# Patient Record
Sex: MALE | Race: WHITE | Employment: FULL TIME | URBAN - METROPOLITAN AREA
[De-identification: names, ages, dates, MRNs, and addresses within clinical notes are randomized per-mention and may not be internally consistent; named-entity substitution may affect disease eponyms.]

---

## 2021-01-21 PROBLEM — R06.83 SNORING: Status: ACTIVE | Noted: 2021-01-21

## 2021-01-21 PROBLEM — R09.81 NASAL CONGESTION: Status: ACTIVE | Noted: 2021-01-21

## 2021-02-13 ENCOUNTER — APPOINTMENT (OUTPATIENT)
Dept: GENERAL RADIOLOGY | Facility: HOSPITAL | Age: 31
End: 2021-02-13
Attending: EMERGENCY MEDICINE
Payer: OTHER MISCELLANEOUS

## 2021-02-13 ENCOUNTER — HOSPITAL ENCOUNTER (EMERGENCY)
Facility: HOSPITAL | Age: 31
Discharge: HOME | End: 2021-02-13
Attending: EMERGENCY MEDICINE
Payer: OTHER MISCELLANEOUS

## 2021-02-13 VITALS
BODY MASS INDEX: 24.11 KG/M2 | HEART RATE: 88 BPM | RESPIRATION RATE: 18 BRPM | HEIGHT: 66 IN | SYSTOLIC BLOOD PRESSURE: 134 MMHG | OXYGEN SATURATION: 97 % | DIASTOLIC BLOOD PRESSURE: 84 MMHG | WEIGHT: 150 LBS | TEMPERATURE: 98 F

## 2021-02-13 DIAGNOSIS — S62.524B OPEN NONDISPLACED FRACTURE OF DISTAL PHALANX OF RIGHT THUMB, INITIAL ENCOUNTER: ICD-10-CM

## 2021-02-13 DIAGNOSIS — S61.011A THUMB LACERATION, RIGHT, INITIAL ENCOUNTER: Primary | ICD-10-CM

## 2021-02-13 PROCEDURE — 99283 EMERGENCY DEPT VISIT LOW MDM: CPT

## 2021-02-13 PROCEDURE — 12002 RPR S/N/AX/GEN/TRNK2.6-7.5CM: CPT

## 2021-02-13 PROCEDURE — 73140 X-RAY EXAM OF FINGER(S): CPT | Performed by: EMERGENCY MEDICINE

## 2021-02-13 RX ORDER — IBUPROFEN 600 MG/1
600 TABLET ORAL ONCE
Status: COMPLETED | OUTPATIENT
Start: 2021-02-13 | End: 2021-02-13

## 2021-02-13 RX ORDER — CEPHALEXIN 500 MG/1
500 CAPSULE ORAL ONCE
Status: COMPLETED | OUTPATIENT
Start: 2021-02-13 | End: 2021-02-13

## 2021-02-13 RX ORDER — CEPHALEXIN 500 MG/1
500 CAPSULE ORAL 3 TIMES DAILY
Qty: 21 CAPSULE | Refills: 0 | Status: SHIPPED | OUTPATIENT
Start: 2021-02-13 | End: 2021-02-20

## 2021-02-13 RX ORDER — HYDROCODONE BITARTRATE AND ACETAMINOPHEN 5; 325 MG/1; MG/1
2 TABLET ORAL ONCE
Status: COMPLETED | OUTPATIENT
Start: 2021-02-13 | End: 2021-02-13

## 2021-02-13 RX ORDER — HYDROCODONE BITARTRATE AND ACETAMINOPHEN 5; 325 MG/1; MG/1
1 TABLET ORAL EVERY 6 HOURS PRN
Qty: 10 TABLET | Refills: 0 | Status: SHIPPED | OUTPATIENT
Start: 2021-02-13 | End: 2021-09-27

## 2021-02-13 RX ORDER — LIDOCAINE HYDROCHLORIDE 10 MG/ML
20 INJECTION, SOLUTION EPIDURAL; INFILTRATION; INTRACAUDAL; PERINEURAL ONCE
Status: COMPLETED | OUTPATIENT
Start: 2021-02-13 | End: 2021-02-13

## 2021-02-13 RX ORDER — IBUPROFEN 600 MG/1
600 TABLET ORAL EVERY 8 HOURS PRN
Qty: 15 TABLET | Refills: 0 | Status: SHIPPED | OUTPATIENT
Start: 2021-02-13 | End: 2021-02-18

## 2021-02-14 NOTE — CM/SW NOTE
Called by Dr. Jimmy Mancuso - he spoke with Dr. Alex Orellana re: patient's laceration to his Right first digit and Dr. Cara Marie informed Dr. Boo Short he would have his office contact patient with appointment for Monday.  Dr. Jimmy Mancuso requesting ERCM to F/U with Anemia  Anemia  BPH (benign prostatic hyperplasia)    Chronic kidney disease  CKD (chronic kidney disease)  HTN (hypertension)    Hypothyroidism  Hypothyroid  Pneumonia    Type 2 diabetes mellitus  DM (diabetes mellitus)

## 2021-02-14 NOTE — ED PROVIDER NOTES
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   He did recommend nail removal if possible given the duskiness seen in my concern with causing further injury and the fact that the digital block still left the patient with some remnant pain I elected to loosely close t 600 MG Oral Tab  Take 1 tablet (600 mg total) by mouth every 8 (eight) hours as needed for Pain or Fever., Normal, Disp-15 tablet, R-0    HYDROcodone-acetaminophen 5-325 MG Oral Tab  Take 1 tablet by mouth every 6 (six) hours as needed., Normal, Disp-10 ta

## 2021-02-14 NOTE — ED NOTES
Pt provided and explained d/c instructions, at-home care, follow-up with hand surgeon on Monday as discussed, and rx. Pt in nad at this time. No iv access. Vss. Sanders. Ambulatory. A&ox3. Right thumb wrapped. Belongings with pt.  All questions and concerns add

## 2021-02-14 NOTE — ED INITIAL ASSESSMENT (HPI)
Pt with laceration to right first digit s/p using band saw to cut a bone-in steak at work. Laceration completely through the finger, minimal active bleeding noted. Pressure dressing applied.  Last tetanus shot January 2021

## 2021-02-15 NOTE — CM/SW NOTE
Contacted Dr Hansen Head office for a follow up appointment today. The pt has an appointment already scheduled and they contacted the pt.

## (undated) NOTE — LETTER
Date & Time: 2/13/2021, 10:44 PM  Patient: Reva Avalos  Encounter Provider(s):    Mindy Wang MD       Occupational restrictions  For: [employer] duty at this time. Hand surgeon to coordinate work restriction release.     Norm